# Patient Record
(demographics unavailable — no encounter records)

---

## 2025-01-14 NOTE — DISCUSSION/SUMMARY
[FreeTextEntry1] : Patient has medical history detailed above and active medical issues including:  - Recurrent chest pain, multiple CAD risk factors.  Coronary CTA and coronary calcium score ordered to assess for obstructive CAD and risk stratification.  Echocardiogram ordered to evaluate for structural heart disease, carotid and abdominal ultrasound to evaluate for PAD with cardiology follow-up.  - Reflux GERD on PPI, patient states upper endoscopy will be scheduled  - Hyperlipidemia, , start Crestor 20 Mg daily with repeat labs 2 months  - Snoring interrupted sleep.  Home sleep apnea study ordered.  Advised patient to follow active lifestyle with regular cardiovascular exercise. Patient educated on heart healthy diet. Recommend increased oral hydration with electrolyte supplement drinks, avoid excess alcohol and caffeine.  Patient is aware to call with any symptoms or concerns.  Cardiology follow-up after noninvasive testing.  Zack will follow-up with Dr Danita Hanks for primary care.  Total time spent 45 minutes, reviewing of test results, chart information, patient discussion, physical exam and completion of chart documentation.

## 2025-01-14 NOTE — REASON FOR VISIT
[Other: ____] : [unfilled] [FreeTextEntry1] : Zack has a past medical history of hyperlipidemia, , GERD, snoring interrupted sleep.  Patient speaks Arabic, office visit done with son-in-law translating English and Arabic  No history of CAD, MI, revascularization, VHD, CHF, TIA, CVA, diabetes, PVD, DVT, PE, arrhythmia, AF.  Patient has dyspnea with moderate exertion.  Patient has recurrent chest pain sharp and burning while laying down.  Cardiovascular review of symptoms is negative for exertional chest pain, palpitations, dizziness or syncope.  No PND or orthopnea leg edema.  No bleeding or black stool.  No exercise routine.  Patient is walking 15 minutes on occasion  EKG sinus bradycardia IRBBB  Labs Dec 2024, normal CBC, BMP, HbA1c, LFT, TSH, total cholesterol 232, , HDL 52, triglyceride 132 daily assessment

## 2025-02-20 NOTE — DISCUSSION/SUMMARY
[FreeTextEntry1] : DAMARI GARCIA is a 68 year old M who presents with the above history and the following active issues:  There is CP that is resolving/itching throat/burning that's been going on for 5 years. Nonexertional. Reports intermittent headaches, dizziness, and lightheadedness. Prior edema that has resolved. CTA with no CAD.   ETT with no significant ischemia.    Recommend following with PCP re: noncardiac findings. Fasting labs 2 months from when statin started. Increase CV exercise.    - Reflux GERD on PPI, patient states upper endoscopy will be scheduled  - Hyperlipidemia, , continue Crestor 20 Mg daily with repeat labs 2 months  - Snoring interrupted sleep. Sleep study as above.   Ongoing f/u with PCP.  F/U 6 months.

## 2025-02-20 NOTE — HISTORY OF PRESENT ILLNESS
[FreeTextEntry1] : DAMARI GARCIA is a 68 year old male with a past medical history of hyperlipidemia, , GERD, snoring interrupted sleep.  Last seen 1125. Here to review results of exercise tolerance test.  He is currently asymptomatic.   There is CP that is resolving/itching throat/burning that's been going on for 5 years. Nonexertional. Reports intermittent headaches, dizziness, and lightheadedness. Prior edema that has resolved. Denies SOB, palpitations. Denies syncope, and claudication. Former smoker. Active without exertional complaints.    Testin25  Exercise tolerance test.  Completed 4minutes of Sudarshan protocol achieving 97% MPHR.  Peak BP was 180/60mmHg.  Asymptomatic.  Wet reading was positive for ischemia by EKG, but on my interpretation, there was <1mm upsloping ST depressions from the J point inferolaterally.  Would consider negative test.    Sleep study 25: Mild EMMANUELLE. Would not treat for sleep apnea based on this study. Recommend weight loss. Avoid sleeping in supine positoon.  Echo 25: CONCLUSIONS: 1. Left ventricular systolic function is normal with an ejection fraction visually estimated at 60 to 65 %. 2. Normal left ventricular diastolic function. 3. Normal right ventricular cavity size and normal right ventricular systolic function. 4. Normal left and right atrial size. 5. Trace mitral regurgitation. 6. Trace tricuspid regurgitation. 7. Estimated pulmonary artery systolic pressure is 26 mmHg. 8. Trace pulmonic regurgitation. 9. Interatrial septum is aneurysmal. 10. No pericardial effusion seen. 11. No prior echocardiogram is available for comparison  Abd U/S 25: CONCLUSIONS: 1. No evidence of abdominal aortic aneurysm. 2. No clinically significant atherosclerosis noted in the abdominal aorta. 3. No prior exam is available for comparison.  Carotid 25: CONCLUSIONS: 1. High bifurcation bilaterally limits ability to visualize the ECA and ICA accurately for assessment. 2. Vertebral arteries: antegrade flow bilaterally. 3. No prior exam is available for comparison.  Coronary CTA 25: Hiatal hernia. Descending thoracic aorta is tortuous. Spondylosis of the imaged thoracic spine. BL hynecomastia. Angiographically normal epicoardial coronary arteries. Normal coronary artery calcium score 0.